# Patient Record
Sex: MALE | Race: WHITE | NOT HISPANIC OR LATINO | URBAN - METROPOLITAN AREA
[De-identification: names, ages, dates, MRNs, and addresses within clinical notes are randomized per-mention and may not be internally consistent; named-entity substitution may affect disease eponyms.]

---

## 2017-05-31 NOTE — PATIENT DISCUSSION
DERMATOCHALASIS  OU:  NOT BOTHERSOME TO THE PATIENT AT THIS TIME. FOLLOW. PATIENT TO CALL BACK WHEN BOTHERSOME , SCHEDULE APPT WITH DR. Shantelle Hills WHEN READY.

## 2017-05-31 NOTE — PATIENT DISCUSSION
JENNIFER OU:  PRESCRIBED UV PROTECTION TO SLOW GROWTH. PRESCRIBE ARTIFICAL TEARS TO INCREASE COMFORT.

## 2017-06-27 ENCOUNTER — IMPORTED ENCOUNTER (OUTPATIENT)
Dept: URBAN - METROPOLITAN AREA CLINIC 9 | Facility: CLINIC | Age: 62
End: 2017-06-27

## 2017-07-27 ENCOUNTER — IMPORTED ENCOUNTER (OUTPATIENT)
Dept: URBAN - METROPOLITAN AREA CLINIC 9 | Facility: CLINIC | Age: 62
End: 2017-07-27

## 2017-08-22 ENCOUNTER — IMPORTED ENCOUNTER (OUTPATIENT)
Dept: URBAN - METROPOLITAN AREA CLINIC 9 | Facility: CLINIC | Age: 62
End: 2017-08-22

## 2018-09-05 NOTE — PATIENT DISCUSSION
CONTACT LENS FITTING: PATIENT INSTRUCTED IN INSERTION AND REMOVAL. PATIENT ABLE TO INSERT AND REMOVE CONTACTS WITHOUT DIFFICULTY. PATIENT IS HAPPY WITH VISION AND COMFORT. PATIENT INSTRUCTED ON PROPER CONTACT LENS WEAR AND CARE. CONTACT LENS FIT WELL. PATIENT SENT HOME WITH SAMPLE CONTACTS, CONTACT LENS KIT AND RX.

## 2018-11-07 ENCOUNTER — IMPORTED ENCOUNTER (OUTPATIENT)
Dept: URBAN - METROPOLITAN AREA CLINIC 9 | Facility: CLINIC | Age: 63
End: 2018-11-07

## 2020-05-26 ENCOUNTER — IMPORTED ENCOUNTER (OUTPATIENT)
Dept: URBAN - METROPOLITAN AREA CLINIC 9 | Facility: CLINIC | Age: 65
End: 2020-05-26

## 2020-06-09 ENCOUNTER — IMPORTED ENCOUNTER (OUTPATIENT)
Dept: URBAN - METROPOLITAN AREA CLINIC 9 | Facility: CLINIC | Age: 65
End: 2020-06-09

## 2020-06-15 NOTE — PATIENT DISCUSSION
06/15/2020OS+2.00+[id number] -2&nbsp;SN &nbsp; &nbsp; [facility]

## 2020-06-15 NOTE — PATIENT DISCUSSION
06/15/20201-day Ericuvue MoistOS+[id number]. [id number]&nbsp; &nbsp; J7&nbsp;JG [facility]

## 2020-07-08 ENCOUNTER — IMPORTED ENCOUNTER (OUTPATIENT)
Dept: URBAN - METROPOLITAN AREA CLINIC 9 | Facility: CLINIC | Age: 65
End: 2020-07-08

## 2020-11-05 NOTE — PATIENT DISCUSSION
Dendritic Keratitis OS: Prescribed Zirgan 0.15% gel OS 5x/day.  RTC as directed for follow-up visit/sooner if symptoms increase/persist.

## 2020-11-13 NOTE — PATIENT DISCUSSION
DENDRITIC KERATITIS OS, RESOLVED: VIROPTIC TID UNTIL TUESDAY THEN D/C. RTC AS DIRECTED FOR FOLLOW-UP VISIT/SOONER IF SYMPTOMS RECUR.

## 2021-09-30 ENCOUNTER — IMPORTED ENCOUNTER (OUTPATIENT)
Dept: URBAN - METROPOLITAN AREA CLINIC 9 | Facility: CLINIC | Age: 66
End: 2021-09-30

## 2021-10-15 ASSESSMENT — VISUAL ACUITY
OD_PH: 20/40 SN
OS_PH: 20/40 SN
OS_SC: 20/40 SN
OD_SC: 20/40 -2 SN
OS_CC: 20/30 SN
OD_CC: 20/40 SN
OS_CC: 20/40 SN
OD_CC: 20/40 SN
OD_SC: 20/50 SN
OD_CC: 20/30 SN
OD_CC: 20/25 SN
OS_CC: 20/30 SN
OD_CC: 20/40 SN
OS_CC: 20/25 SN
OS_CC: 20/25 SN
OS_CC: 20/30 SN
OS_SC: 20/30 + SN
OS_SC: 20/40 SN
OS_CC: 20/30 SN
OD_SC: 20/40 SN
OS_CC: 20/40 SN
OD_CC: 20/60 SN
OD_CC: 20/40 SN
OD_CC: 20/30 SN

## 2021-10-15 ASSESSMENT — TONOMETRY
OD_IOP_MMHG: 8
OD_IOP_MMHG: 13
OS_IOP_MMHG: 13
OS_IOP_MMHG: 14
OD_IOP_MMHG: 14
OD_IOP_MMHG: 15
OD_IOP_MMHG: 13
OS_IOP_MMHG: 9
OD_IOP_MMHG: 14
OD_IOP_MMHG: 12
OS_IOP_MMHG: 8
OS_IOP_MMHG: 13
OS_IOP_MMHG: 14
OS_IOP_MMHG: 11
OS_IOP_MMHG: 15
OD_IOP_MMHG: 12

## 2021-10-15 ASSESSMENT — KERATOMETRY
OD_K1POWER_DIOPTERS: 44.5
OD_AXISANGLE_DEGREES: 1
OD_K2POWER_DIOPTERS: 43.25
OS_K1POWER_DIOPTERS: 44.5
OS_K2POWER_DIOPTERS: 43.5
OS_AXISANGLE_DEGREES: 17
OS_AXISANGLE2_DEGREES: 107
OD_AXISANGLE2_DEGREES: 91

## 2022-07-08 RX ORDER — MONTELUKAST SODIUM 10 MG/1
TABLET ORAL
COMMUNITY

## 2022-07-08 RX ORDER — GABAPENTIN 300 MG/1
1 CAPSULE ORAL
COMMUNITY

## 2022-07-08 RX ORDER — FLUTICASONE PROPIONATE 50 MCG
SPRAY, SUSPENSION (ML) NASAL
COMMUNITY

## 2022-12-08 ENCOUNTER — ESTABLISHED PATIENT (OUTPATIENT)
Dept: URBAN - METROPOLITAN AREA CLINIC 16 | Facility: CLINIC | Age: 67
End: 2022-12-08

## 2022-12-08 PROCEDURE — 92015 DETERMINE REFRACTIVE STATE: CPT

## 2022-12-08 PROCEDURE — 92134 CPTRZ OPH DX IMG PST SGM RTA: CPT

## 2022-12-08 PROCEDURE — 92014 COMPRE OPH EXAM EST PT 1/>: CPT

## 2022-12-08 ASSESSMENT — VISUAL ACUITY
OS_BCVA: 20/30
OD_BCVA: 20/25
OD_PH: 20/30-1
OS_CC: 20/40-1
OD_CC: 20/40
OS_PH: 20/30-1

## 2022-12-08 ASSESSMENT — TONOMETRY
OD_IOP_MMHG: 10
OS_IOP_MMHG: 11

## 2024-04-10 ENCOUNTER — ESTABLISHED PATIENT (OUTPATIENT)
Dept: URBAN - METROPOLITAN AREA CLINIC 16 | Facility: CLINIC | Age: 69
End: 2024-04-10

## 2024-04-10 DIAGNOSIS — H35.371: ICD-10-CM

## 2024-04-10 DIAGNOSIS — H52.223: ICD-10-CM

## 2024-04-10 DIAGNOSIS — H43.813: ICD-10-CM

## 2024-04-10 DIAGNOSIS — H04.123: ICD-10-CM

## 2024-04-10 PROCEDURE — 92134 CPTRZ OPH DX IMG PST SGM RTA: CPT

## 2024-04-10 PROCEDURE — 92014 COMPRE OPH EXAM EST PT 1/>: CPT

## 2024-04-10 PROCEDURE — 92015 DETERMINE REFRACTIVE STATE: CPT

## 2024-04-10 ASSESSMENT — TONOMETRY
OD_IOP_MMHG: 10
OS_IOP_MMHG: 10

## 2024-04-10 ASSESSMENT — VISUAL ACUITY
OS_CC: 20/25-1
OD_CC: 20/30-2

## 2025-07-29 ENCOUNTER — COMPREHENSIVE EXAM (OUTPATIENT)
Age: 70
End: 2025-07-29

## 2025-07-29 DIAGNOSIS — H52.4: ICD-10-CM

## 2025-07-29 DIAGNOSIS — H35.371: ICD-10-CM

## 2025-07-29 PROCEDURE — 92250 FUNDUS PHOTOGRAPHY W/I&R: CPT

## 2025-07-29 PROCEDURE — 92014 COMPRE OPH EXAM EST PT 1/>: CPT

## 2025-07-29 PROCEDURE — 92015 DETERMINE REFRACTIVE STATE: CPT
